# Patient Record
(demographics unavailable — no encounter records)

---
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
## 2024-10-23 NOTE — PHYSICAL EXAM
[General Appearance - Alert] : alert [Oriented To Time, Place, And Person] : oriented to person, place, and time [Person] : oriented to person [Place] : oriented to place [Time] : oriented to time [Abnormal Walk] : normal gait [Balance] : balance was intact [2+] : Patella left 2+ [] : no respiratory distress

## 2024-10-23 NOTE — ADDENDUM
[FreeTextEntry1] : The patient is a 37-year-old gentleman who presents the office with complaints of severe lower back pain and intermittent bilateral lower extremity radicular symptoms.  The patient states that his symptoms began several months ago and have been getting progressively worse.  He has had multiple sessions with physical therapy as well as epidural steroid injections with no significant relief of his symptoms.  MRI of the lumbar spine shows an L4-5 disc herniation causing significant bilateral lateral recess stenosis.  X-rays of the lumbar spine show questionable L5-S1 pars defect.  Given the patient's current symptoms and imaging findings, I feel that he would benefit from procedure to remove the offending disc herniation at L4-5.  It would also be prudent to obtain a CT scan of the patient's lumbar spine in order to evaluate for a pars defect if the patient has a fracture and L5, he may also require an L5-S1 fusion.  I discussed this with the patient and he understood.  All of his questions were answered to his satisfaction.  He is in agreement with the plan.

## 2024-10-23 NOTE — RESULTS/DATA
[FreeTextEntry1] : DATE OF SERVICE: 09/17/2024 @[facility]  MAGNETIC RESONANCE IMAGING OF THE LUMBAR SPINE  TECHNIQUE: Multiplanar, multisequential MRI was performed in the neutral/sitting position.  HISTORY: Patient complains of low back pain.  COMPARISON: Previous MRI, 02/13/2022.  INTERPRETATION: Mild levoscoliosis and straightening of lumbar lordosis again noted.  T12-L1, L1-2: No disc herniation, central canal or foraminal narrowing.  L2-3: Posterior disc bulge indents the thecal sac. Canal and neural foramina are patent.  L3-4: Disc bulge with proximal foraminal encroachment and central disc herniation with thecal sac deformity unchanged.  L4-5: Disc bulge with broad disc herniation and 3-mm caudal migration with left foraminal extension impinging the traversing left L5 and exiting left L4 nerve roots unchanged.  L5-S1: 3-mm grade I anterolisthesis without pars defects unchanged. Broad disc bulge with bilateral foraminal encroachment. Canal is patent. Facet hypertrophy.  Disc space narrowing and disc hydration loss at L3-4 through L5-S1.  No central canal stenosis.  Examination otherwise demonstrates the remaining lumbar vertebral bodies and intervertebral discs to be unremarkable in height and signal. The conus medullaris is unremarkable in signal, morphology and position. No focal prevertebral or posterior paraspinal abnormal masses or altered signals are otherwise noted.  IMPRESSION:  * L3-4: Disc bulge with foraminal narrowing and a central disc herniation unchanged.  * L4-5: Disc bulge and broad disc herniation with asymmetric left foraminal extension and thecal sac deformity impinging the traversing left L5 and exiting left L4 nerve roots.  * L5-S1: Stable 3-mm grade I anterolisthesis and disc bulge with foraminal narrowing and facet hypertrophy.  * L2-3: Posterior disc bulge indents the thecal sac. Canal and neural foramina are patent.  Nir Shore MD

## 2024-10-23 NOTE — ASSESSMENT
[FreeTextEntry1] : L4-5 disc herniation.  Possible pars fracture L5/S1.  Plan:  Discussed L4/5 discectomy. Risks and benefits discussed.  First will need to confirm if pars fracture is present.  Return once CT lumbar is completed.   Pre-op packet given to the patient.  G wipes with instructions given to the patient.

## 2025-01-10 NOTE — REASON FOR VISIT
[Home] : at home, [unfilled] , at the time of the visit. [Medical Office: ([facility])___] : at the medical office located in  [Patient] : the patient [Self] : self [Follow-Up: _____] : a [unfilled] follow-up visit

## 2025-01-10 NOTE — ADDENDUM
[FreeTextEntry1] : The patient is a 37-year-old gentleman who presents via telehealth for follow-up regarding his severe low back pain and lower extremity radicular symptoms.  The patient continues to have significant pain that is unresolved with epidural injections and physical therapy.  He was seen previously in October 2024 with an MRI of the lumbar spine that shows a large L4-5 disc herniation and a small spondylolisthesis at L5-S1.  He has a new CT scan of his lumbar spine available for review.  Today this CT shows bilateral L5 pars fractures with severe neuroforaminal stenosis bilaterally impacting the bilateral exiting L5 nerve roots.  Given the patient's symptoms and imaging findings, I feel he would benefit from procedure to fuse the L5-S1 segment as well as to decompress the L4-5 segment.  I discussed with him the risks, benefits, alternatives and expected outcomes of performing an L5-S1 transforaminal lumbar interbody fusion and posterior fusion with instrumentation as well as an L4-5 microdiscectomy.  The patient understood, all of his questions were answered to his satisfaction he would like to proceed with surgery.  He is given instructions regarding medical pretesting and scheduling for the operation.
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
## 2025-02-14 NOTE — PHYSICAL EXAM
[General Appearance - Alert] : alert [Longitudinal] : longitudinal [Clean] : clean [Dry] : dry [Healing Well] : healing well [Intact] : intact [No Drainage] : without drainage [Oriented To Time, Place, And Person] : oriented to person, place, and time [Over the Past 2 Weeks, Have You Felt Down, Depressed, or Hopeless?] : 1.) Over the past 2 weeks, have you felt down, depressed, or hopeless? No [Over the Past 2 Weeks, Have You Felt Little Interest or Pleasure Doing Things?] : 2.) Over the past 2 weeks, have you felt little interest or pleasure doing things? No [Motor Tone] : muscle tone was normal in all four extremities [Motor Strength] : muscle strength was normal in all four extremities

## 2025-02-14 NOTE — REASON FOR VISIT
[de-identified] : Laminectomy with bilateral medial facetectomies and foraminotomies, L4-5 and L5-S1. Posterior releasing osteotomy, L4-5 and L5-S1. Transforaminal lumbar interbody fusion and posterior fusion, L4-5 and L5-S1. Posterior segmental instrumentation, L4, L5, S1. Use of a biomechanical device local autograft bone and allograft bone to facilitate fusion. [de-identified] : 02/05/2025
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
## 2025-02-14 NOTE — ASSESSMENT
[FreeTextEntry1] : Wound Vac removed. Incision looks good with no swelling or redness noted.   Patient is doing well and reports pain is well controlled.   Plan to follow up in 1 week for 2 week post-op appointment.

## 2025-03-21 NOTE — PHYSICAL EXAM
[General Appearance - Alert] : alert [Longitudinal] : longitudinal [Clean] : clean [Dry] : dry [Healing Well] : healing well [Intact] : intact [No Drainage] : without drainage [Oriented To Time, Place, And Person] : oriented to person, place, and time [Motor Tone] : muscle tone was normal in all four extremities [Motor Strength] : muscle strength was normal in all four extremities [Well-Healed] : well-healed [Abnormal Walk] : normal gait [Balance] : balance was intact [Over the Past 2 Weeks, Have You Felt Down, Depressed, or Hopeless?] : 1.) Over the past 2 weeks, have you felt down, depressed, or hopeless? No [Over the Past 2 Weeks, Have You Felt Little Interest or Pleasure Doing Things?] : 2.) Over the past 2 weeks, have you felt little interest or pleasure doing things? No

## 2025-03-21 NOTE — REASON FOR VISIT
[de-identified] : Laminectomy with bilateral medial facetectomies and foraminotomies, L4-5 and L5-S1. Posterior releasing osteotomy, L4-5 and L5-S1. Transforaminal lumbar interbody fusion and posterior fusion, L4-5 and L5-S1. Posterior segmental instrumentation, L4, L5, S1. Use of a biomechanical device local autograft bone and allograft bone to facilitate fusion. [de-identified] : 02/05/2025

## 2025-03-21 NOTE — REASON FOR VISIT
[de-identified] : Laminectomy with bilateral medial facetectomies and foraminotomies, L4-5 and L5-S1. Posterior releasing osteotomy, L4-5 and L5-S1. Transforaminal lumbar interbody fusion and posterior fusion, L4-5 and L5-S1. Posterior segmental instrumentation, L4, L5, S1. Use of a biomechanical device local autograft bone and allograft bone to facilitate fusion. [de-identified] : 02/05/2025

## 2025-03-21 NOTE — ADDENDUM
[FreeTextEntry1] : The patient is a 38-year-old gentleman who is 6 weeks status post L4-S1 decompression fusion.  He is doing very well.  He denies any significant low back or lower extremity radicular pain.  X-rays of the spine show anatomic alignment of the spine without evidence of instrumentation failure.  At this point, I recommended that the patient's begin a course of physical therapy.  He should return to the office in 6 weeks for routine follow-up.  He knows to call the office if he should have any questions, concerns or complaints.

## 2025-03-21 NOTE — REASON FOR VISIT
[de-identified] : Laminectomy with bilateral medial facetectomies and foraminotomies, L4-5 and L5-S1. Posterior releasing osteotomy, L4-5 and L5-S1. Transforaminal lumbar interbody fusion and posterior fusion, L4-5 and L5-S1. Posterior segmental instrumentation, L4, L5, S1. Use of a biomechanical device local autograft bone and allograft bone to facilitate fusion. [de-identified] : 02/05/2025
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
